# Patient Record
Sex: MALE | Race: BLACK OR AFRICAN AMERICAN
[De-identification: names, ages, dates, MRNs, and addresses within clinical notes are randomized per-mention and may not be internally consistent; named-entity substitution may affect disease eponyms.]

---

## 2018-07-14 ENCOUNTER — HOSPITAL ENCOUNTER (EMERGENCY)
Dept: HOSPITAL 36 - ER | Age: 23
Discharge: HOME | End: 2018-07-14
Payer: MEDICAID

## 2018-07-14 DIAGNOSIS — Z98.890: ICD-10-CM

## 2018-07-14 DIAGNOSIS — R56.9: Primary | ICD-10-CM

## 2018-07-14 LAB
ALBUMIN SERPL-MCNC: 4.8 GM/DL (ref 4.2–5.5)
ALBUMIN/GLOB SERPL: 1.9 {RATIO} (ref 1–1.8)
ALP SERPL-CCNC: 71 U/L (ref 34–104)
ALT SERPL-CCNC: 8 U/L (ref 7–52)
ANION GAP SERPL CALC-SCNC: 13.1 MMOL/L (ref 7–16)
AST SERPL-CCNC: 14 U/L (ref 13–39)
BASOPHILS # BLD AUTO: 0.1 TH/CUMM (ref 0–0.2)
BASOPHILS NFR BLD AUTO: 0.7 % (ref 0–2)
BILIRUB SERPL-MCNC: 1 MG/DL (ref 0.3–1)
BUN SERPL-MCNC: 13 MG/DL (ref 7–25)
CALCIUM SERPL-MCNC: 9.5 MG/DL (ref 8.6–10.3)
CHLORIDE SERPL-SCNC: 103 MEQ/L (ref 98–107)
CO2 SERPL-SCNC: 22.7 MEQ/L (ref 21–31)
CREAT SERPL-MCNC: 0.9 MG/DL (ref 0.7–1.3)
EOSINOPHIL # BLD AUTO: 0.2 TH/CMM (ref 0.1–0.4)
EOSINOPHIL NFR BLD AUTO: 2.6 % (ref 0–5)
ERYTHROCYTE [DISTWIDTH] IN BLOOD BY AUTOMATED COUNT: 12 % (ref 11.5–20)
GLOBULIN SER-MCNC: 2.6 GM/DL
GLUCOSE SERPL-MCNC: 133 MG/DL (ref 70–105)
HCT VFR BLD CALC: 48 % (ref 41–60)
HGB BLD-MCNC: 16 GM/DL (ref 12–16)
LYMPHOCYTE AB SER FC-ACNC: 3 TH/CMM (ref 1.5–3)
LYMPHOCYTES NFR BLD AUTO: 37 % (ref 20–50)
MCH RBC QN AUTO: 29.4 PG (ref 26–30)
MCHC RBC AUTO-ENTMCNC: 33.3 PG (ref 28–36)
MCV RBC AUTO: 88.3 FL (ref 80–99)
MONOCYTES # BLD AUTO: 0.6 TH/CMM (ref 0.3–1)
MONOCYTES NFR BLD AUTO: 7.1 % (ref 2–10)
NEUTROPHILS # BLD: 4.3 TH/CMM (ref 1.8–8)
NEUTROPHILS NFR BLD AUTO: 52.6 % (ref 40–80)
PLATELET # BLD: 349 TH/CMM (ref 150–400)
PMV BLD AUTO: 7.2 FL
POTASSIUM SERPL-SCNC: 3.8 MEQ/L (ref 3.5–5.1)
RBC # BLD AUTO: 5.44 MIL/CMM (ref 4.3–5.7)
SODIUM SERPL-SCNC: 135 MEQ/L (ref 136–145)
WBC # BLD AUTO: 8.2 TH/CMM (ref 4.8–10.8)

## 2018-07-14 PROCEDURE — Z7610: HCPCS
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
## 2018-07-15 NOTE — DIAGNOSTIC IMAGING REPORT
Head CT without intravenous contrast



Indication: Seizures



Comparison: None 



Technique: Axial images were obtained from the vertex to the skull base

without IV contrast. Coronal reconstructions were made.   Total DLP:

[id number],  [id number]





FINDINGS:



Images of the brain obtained without contrast demonstrate postsurgical

changes with evidence of previous left frontal craniotomies.  There is

appears to be evidence of previous right frontal craniotomy.  Exam is

limited due to motion.  Encephalomalacia seen with throughout the left

frontal lobe with ex vacuo dilatation of the frontal horn of the left

lateral ventricle.  No mass effect or midline shift.  The basal cisterns

are patent.  Assessment for skull fracture is limited due to motion.



The paranasal sinuses are clear.



IMPRESSION:



Limited exam due to motion.  No evidence of acute intracranial

hemorrhage.



Postsurgical changes, most pronounced in the left frontal lobe.  Left

frontal lobe encephalomalacia is also noted which may have been due to

old trauma or infarct.  Please correlate with clinical history and old

exams.